# Patient Record
Sex: FEMALE | Race: BLACK OR AFRICAN AMERICAN | NOT HISPANIC OR LATINO | URBAN - METROPOLITAN AREA
[De-identification: names, ages, dates, MRNs, and addresses within clinical notes are randomized per-mention and may not be internally consistent; named-entity substitution may affect disease eponyms.]

---

## 2019-08-23 ENCOUNTER — EMERGENCY (EMERGENCY)
Facility: HOSPITAL | Age: 34
LOS: 1 days | Discharge: HOME | End: 2019-08-23
Admitting: EMERGENCY MEDICINE
Payer: MEDICAID

## 2019-08-23 VITALS
OXYGEN SATURATION: 98 % | RESPIRATION RATE: 18 BRPM | WEIGHT: 145.06 LBS | SYSTOLIC BLOOD PRESSURE: 138 MMHG | DIASTOLIC BLOOD PRESSURE: 76 MMHG | HEART RATE: 73 BPM | TEMPERATURE: 98 F

## 2019-08-23 LAB
ALBUMIN SERPL ELPH-MCNC: 3.6 G/DL — SIGNIFICANT CHANGE UP (ref 3.4–5)
ALP SERPL-CCNC: 53 U/L — SIGNIFICANT CHANGE UP (ref 40–120)
ALT FLD-CCNC: 11 U/L — LOW (ref 12–42)
ANION GAP SERPL CALC-SCNC: 7 MMOL/L — LOW (ref 9–16)
AST SERPL-CCNC: 14 U/L — LOW (ref 15–37)
BILIRUB SERPL-MCNC: 0.3 MG/DL — SIGNIFICANT CHANGE UP (ref 0.2–1.2)
BLD GP AB SCN SERPL QL: NEGATIVE — SIGNIFICANT CHANGE UP
BUN SERPL-MCNC: 11 MG/DL — SIGNIFICANT CHANGE UP (ref 7–23)
CALCIUM SERPL-MCNC: 9.2 MG/DL — SIGNIFICANT CHANGE UP (ref 8.5–10.5)
CHLORIDE SERPL-SCNC: 102 MMOL/L — SIGNIFICANT CHANGE UP (ref 96–108)
CO2 SERPL-SCNC: 27 MMOL/L — SIGNIFICANT CHANGE UP (ref 22–31)
CREAT SERPL-MCNC: 0.54 MG/DL — SIGNIFICANT CHANGE UP (ref 0.5–1.3)
GLUCOSE SERPL-MCNC: 79 MG/DL — SIGNIFICANT CHANGE UP (ref 70–99)
HCG SERPL-ACNC: HIGH MIU/ML
HCT VFR BLD CALC: 38.3 % — SIGNIFICANT CHANGE UP (ref 34.5–45)
HGB BLD-MCNC: 12.7 G/DL — SIGNIFICANT CHANGE UP (ref 11.5–15.5)
LIDOCAIN IGE QN: 84 U/L — SIGNIFICANT CHANGE UP (ref 73–393)
MCHC RBC-ENTMCNC: 30.2 PG — SIGNIFICANT CHANGE UP (ref 27–34)
MCHC RBC-ENTMCNC: 33.2 G/DL — SIGNIFICANT CHANGE UP (ref 32–36)
MCV RBC AUTO: 91.2 FL — SIGNIFICANT CHANGE UP (ref 80–100)
PLATELET # BLD AUTO: 205 K/UL — SIGNIFICANT CHANGE UP (ref 150–400)
POTASSIUM SERPL-MCNC: 4 MMOL/L — SIGNIFICANT CHANGE UP (ref 3.5–5.3)
POTASSIUM SERPL-SCNC: 4 MMOL/L — SIGNIFICANT CHANGE UP (ref 3.5–5.3)
PROT SERPL-MCNC: 7.5 G/DL — SIGNIFICANT CHANGE UP (ref 6.4–8.2)
RBC # BLD: 4.2 M/UL — SIGNIFICANT CHANGE UP (ref 3.8–5.2)
RBC # FLD: 12.7 % — SIGNIFICANT CHANGE UP (ref 10.3–14.5)
RH IG SCN BLD-IMP: POSITIVE — SIGNIFICANT CHANGE UP
RH IG SCN BLD-IMP: POSITIVE — SIGNIFICANT CHANGE UP
SODIUM SERPL-SCNC: 136 MMOL/L — SIGNIFICANT CHANGE UP (ref 132–145)
WBC # BLD: 9.7 K/UL — SIGNIFICANT CHANGE UP (ref 3.8–10.5)
WBC # FLD AUTO: 9.7 K/UL — SIGNIFICANT CHANGE UP (ref 3.8–10.5)

## 2019-08-23 PROCEDURE — 76801 OB US < 14 WKS SINGLE FETUS: CPT | Mod: 26

## 2019-08-23 PROCEDURE — 99284 EMERGENCY DEPT VISIT MOD MDM: CPT

## 2019-08-23 PROCEDURE — 76817 TRANSVAGINAL US OBSTETRIC: CPT | Mod: 26

## 2019-08-23 RX ORDER — SODIUM CHLORIDE 9 MG/ML
3 INJECTION INTRAMUSCULAR; INTRAVENOUS; SUBCUTANEOUS ONCE
Refills: 0 | Status: COMPLETED | OUTPATIENT
Start: 2019-08-23 | End: 2019-08-23

## 2019-08-23 RX ADMIN — SODIUM CHLORIDE 3 MILLILITER(S): 9 INJECTION INTRAMUSCULAR; INTRAVENOUS; SUBCUTANEOUS at 13:16

## 2019-08-23 NOTE — ED ADULT NURSE NOTE - OBJECTIVE STATEMENT
c/o lower abdominal cramping, lmp 07/03/19, when asked if she's pregnant states "Could be" denies additional symptoms. no s/s of distress, awaiting provider eval

## 2019-08-23 NOTE — ED ADULT TRIAGE NOTE - CHIEF COMPLAINT QUOTE
LMP 7/3. States she could be pregnant, feels abdominal cramping with nausea. No bleeding or discharge

## 2019-08-23 NOTE — ED PROVIDER NOTE - NSFOLLOWUPINSTRUCTIONS_ED_ALL_ED_FT
Hydrate well.    Take prenatal vitamins as prescribed.    Follow up OB/GYN.    Return for vaginal bleeding, increased pain, or other concerns.

## 2019-08-23 NOTE — ED PROVIDER NOTE - OBJECTIVE STATEMENT
35 y/o F, A1, LMP 7/3 presents to ED c/o pelvic pressure and cramping with associated nausea x 3-4 days with vomiting x 2 episodes today.  Pt is sexually active and not on using any birth control.  She suspects she may be pregnant.  She states her menstrual cycle is regular.  She denies fevers/chills, chest pain, cough, SOB, diarrhea, dysuria, hematuria.

## 2019-08-23 NOTE — ED PROVIDER NOTE - CLINICAL SUMMARY MEDICAL DECISION MAKING FREE TEXT BOX
35 y/o F presents to ED to pelvic pain and cramping with associated nausea.  Pt well appearing, VSS. NAD, abd soft, non-tender.  Upreg positive. Pt informed.  TVUS 33 y/o F presents to ED to pelvic pain and cramping with associated nausea.  Pt well appearing, VSS. NAD, abd soft, non-tender.  Upreg positive. Pt informed.  TVUS shows IUP of 8 weeks, 0 days,  with subchorionic hemorrhage.  Results discussed.  Pt advised to f/u with OB.  Will Rx prenatal vitamines.  Return precautions advised.

## 2019-08-28 DIAGNOSIS — O21.9 VOMITING OF PREGNANCY, UNSPECIFIED: ICD-10-CM

## 2019-08-28 DIAGNOSIS — Z3A.00 WEEKS OF GESTATION OF PREGNANCY NOT SPECIFIED: ICD-10-CM

## 2019-08-28 DIAGNOSIS — O20.8 OTHER HEMORRHAGE IN EARLY PREGNANCY: ICD-10-CM

## 2019-08-28 DIAGNOSIS — Z87.891 PERSONAL HISTORY OF NICOTINE DEPENDENCE: ICD-10-CM

## 2019-08-28 DIAGNOSIS — R10.9 UNSPECIFIED ABDOMINAL PAIN: ICD-10-CM

## 2022-08-18 NOTE — ED ADULT NURSE NOTE - PAIN: PRESENCE, MLM
Discharge instructions reviewed with patient. Verbalized understanding. A&O x4. Skin p/w/d. Respirations even and unlabored. No acute distress noted at this time. Patient amb with steady gait on discharge.          Jade Gee RN  08/17/22 [id number]
complains of pain/discomfort

## 2022-12-02 NOTE — ED ADULT NURSE NOTE - NSFALLRSKOUTCOME_ED_ALL_ED
Adbry Monitoring Guidelines: There is no laboratory monitoring requirement with Adbry. Initial Lfts (Optional): pending Is Methotrexate Contraindicated?: No Adbry Dosing: 600 mg SC day 0 then 300 mg SC every other week Diagnosis (Required): Atopic Dermatitis/Eczematous Dermatitis Detail Level: Generalized Pregnancy And Lactation Warning Text: It is unknown if this medication will adversely affect pregnancy or breast feeding. Universal Safety Interventions

## 2023-04-12 ENCOUNTER — EMERGENCY (EMERGENCY)
Facility: HOSPITAL | Age: 38
LOS: 1 days | Discharge: HOME | End: 2023-04-12
Attending: EMERGENCY MEDICINE | Admitting: EMERGENCY MEDICINE
Payer: MEDICAID

## 2023-04-12 VITALS
OXYGEN SATURATION: 99 % | HEART RATE: 71 BPM | RESPIRATION RATE: 16 BRPM | TEMPERATURE: 98 F | DIASTOLIC BLOOD PRESSURE: 90 MMHG | SYSTOLIC BLOOD PRESSURE: 142 MMHG

## 2023-04-12 VITALS
HEART RATE: 77 BPM | DIASTOLIC BLOOD PRESSURE: 87 MMHG | OXYGEN SATURATION: 99 % | RESPIRATION RATE: 18 BRPM | WEIGHT: 169.98 LBS | SYSTOLIC BLOOD PRESSURE: 148 MMHG | TEMPERATURE: 98 F | HEIGHT: 64 IN

## 2023-04-12 DIAGNOSIS — Z3A.01 LESS THAN 8 WEEKS GESTATION OF PREGNANCY: ICD-10-CM

## 2023-04-12 DIAGNOSIS — R10.9 UNSPECIFIED ABDOMINAL PAIN: ICD-10-CM

## 2023-04-12 DIAGNOSIS — Z32.01 ENCOUNTER FOR PREGNANCY TEST, RESULT POSITIVE: ICD-10-CM

## 2023-04-12 DIAGNOSIS — O26.891 OTHER SPECIFIED PREGNANCY RELATED CONDITIONS, FIRST TRIMESTER: ICD-10-CM

## 2023-04-12 LAB
ALBUMIN SERPL ELPH-MCNC: 3.9 G/DL — SIGNIFICANT CHANGE UP (ref 3.4–5)
ALP SERPL-CCNC: 70 U/L — SIGNIFICANT CHANGE UP (ref 40–120)
ALT FLD-CCNC: 19 U/L — SIGNIFICANT CHANGE UP (ref 12–42)
ANION GAP SERPL CALC-SCNC: 8 MMOL/L — LOW (ref 9–16)
APPEARANCE UR: CLEAR — SIGNIFICANT CHANGE UP
APTT BLD: 28.7 SEC — SIGNIFICANT CHANGE UP (ref 27.5–35.5)
AST SERPL-CCNC: 19 U/L — SIGNIFICANT CHANGE UP (ref 15–37)
BASOPHILS # BLD AUTO: 0.06 K/UL — SIGNIFICANT CHANGE UP (ref 0–0.2)
BASOPHILS NFR BLD AUTO: 0.5 % — SIGNIFICANT CHANGE UP (ref 0–2)
BILIRUB SERPL-MCNC: 0.3 MG/DL — SIGNIFICANT CHANGE UP (ref 0.2–1.2)
BILIRUB UR-MCNC: NEGATIVE — SIGNIFICANT CHANGE UP
BUN SERPL-MCNC: 9 MG/DL — SIGNIFICANT CHANGE UP (ref 7–23)
CALCIUM SERPL-MCNC: 9 MG/DL — SIGNIFICANT CHANGE UP (ref 8.5–10.5)
CHLORIDE SERPL-SCNC: 104 MMOL/L — SIGNIFICANT CHANGE UP (ref 96–108)
CO2 SERPL-SCNC: 26 MMOL/L — SIGNIFICANT CHANGE UP (ref 22–31)
COLOR SPEC: YELLOW — SIGNIFICANT CHANGE UP
CREAT SERPL-MCNC: 0.79 MG/DL — SIGNIFICANT CHANGE UP (ref 0.5–1.3)
DIFF PNL FLD: NEGATIVE — SIGNIFICANT CHANGE UP
EGFR: 100 ML/MIN/1.73M2 — SIGNIFICANT CHANGE UP
EOSINOPHIL # BLD AUTO: 0.14 K/UL — SIGNIFICANT CHANGE UP (ref 0–0.5)
EOSINOPHIL NFR BLD AUTO: 1.3 % — SIGNIFICANT CHANGE UP (ref 0–6)
GLUCOSE SERPL-MCNC: 95 MG/DL — SIGNIFICANT CHANGE UP (ref 70–99)
GLUCOSE UR QL: NEGATIVE — SIGNIFICANT CHANGE UP
HCG SERPL-ACNC: 605 MIU/ML — HIGH
HCG UR QL: POSITIVE — SIGNIFICANT CHANGE UP
HCT VFR BLD CALC: 36.5 % — SIGNIFICANT CHANGE UP (ref 34.5–45)
HGB BLD-MCNC: 12.1 G/DL — SIGNIFICANT CHANGE UP (ref 11.5–15.5)
IMM GRANULOCYTES NFR BLD AUTO: 0.3 % — SIGNIFICANT CHANGE UP (ref 0–0.9)
INR BLD: 1.03 — SIGNIFICANT CHANGE UP (ref 0.88–1.16)
KETONES UR-MCNC: NEGATIVE — SIGNIFICANT CHANGE UP
LEUKOCYTE ESTERASE UR-ACNC: NEGATIVE — SIGNIFICANT CHANGE UP
LIDOCAIN IGE QN: 80 U/L — SIGNIFICANT CHANGE UP (ref 73–393)
LYMPHOCYTES # BLD AUTO: 4.52 K/UL — HIGH (ref 1–3.3)
LYMPHOCYTES # BLD AUTO: 41.3 % — SIGNIFICANT CHANGE UP (ref 13–44)
MCHC RBC-ENTMCNC: 30.9 PG — SIGNIFICANT CHANGE UP (ref 27–34)
MCHC RBC-ENTMCNC: 33.2 GM/DL — SIGNIFICANT CHANGE UP (ref 32–36)
MCV RBC AUTO: 93.4 FL — SIGNIFICANT CHANGE UP (ref 80–100)
MONOCYTES # BLD AUTO: 0.66 K/UL — SIGNIFICANT CHANGE UP (ref 0–0.9)
MONOCYTES NFR BLD AUTO: 6 % — SIGNIFICANT CHANGE UP (ref 2–14)
NEUTROPHILS # BLD AUTO: 5.54 K/UL — SIGNIFICANT CHANGE UP (ref 1.8–7.4)
NEUTROPHILS NFR BLD AUTO: 50.6 % — SIGNIFICANT CHANGE UP (ref 43–77)
NITRITE UR-MCNC: NEGATIVE — SIGNIFICANT CHANGE UP
NRBC # BLD: 0 /100 WBCS — SIGNIFICANT CHANGE UP (ref 0–0)
PH UR: 7 — SIGNIFICANT CHANGE UP (ref 5–8)
PLATELET # BLD AUTO: 221 K/UL — SIGNIFICANT CHANGE UP (ref 150–400)
POTASSIUM SERPL-MCNC: 3.8 MMOL/L — SIGNIFICANT CHANGE UP (ref 3.5–5.3)
POTASSIUM SERPL-SCNC: 3.8 MMOL/L — SIGNIFICANT CHANGE UP (ref 3.5–5.3)
PROT SERPL-MCNC: 7.4 G/DL — SIGNIFICANT CHANGE UP (ref 6.4–8.2)
PROT UR-MCNC: NEGATIVE MG/DL — SIGNIFICANT CHANGE UP
PROTHROM AB SERPL-ACNC: 12.1 SEC — SIGNIFICANT CHANGE UP (ref 10.5–13.4)
RBC # BLD: 3.91 M/UL — SIGNIFICANT CHANGE UP (ref 3.8–5.2)
RBC # FLD: 13.2 % — SIGNIFICANT CHANGE UP (ref 10.3–14.5)
SODIUM SERPL-SCNC: 138 MMOL/L — SIGNIFICANT CHANGE UP (ref 132–145)
SP GR SPEC: 1.02 — SIGNIFICANT CHANGE UP (ref 1–1.03)
UROBILINOGEN FLD QL: 1 E.U./DL — SIGNIFICANT CHANGE UP
WBC # BLD: 10.95 K/UL — HIGH (ref 3.8–10.5)
WBC # FLD AUTO: 10.95 K/UL — HIGH (ref 3.8–10.5)

## 2023-04-12 PROCEDURE — 76830 TRANSVAGINAL US NON-OB: CPT | Mod: 26

## 2023-04-12 PROCEDURE — 99284 EMERGENCY DEPT VISIT MOD MDM: CPT

## 2023-04-12 PROCEDURE — 76856 US EXAM PELVIC COMPLETE: CPT | Mod: 26

## 2023-04-12 RX ORDER — ACETAMINOPHEN 500 MG
650 TABLET ORAL ONCE
Refills: 0 | Status: COMPLETED | OUTPATIENT
Start: 2023-04-12 | End: 2023-04-12

## 2023-04-12 RX ADMIN — Medication 650 MILLIGRAM(S): at 17:56

## 2023-04-12 NOTE — ED PROVIDER NOTE - NSFOLLOWUPINSTRUCTIONS_ED_ALL_ED_FT
RETURN IN 48 HRS FOR REPEAT HCG LEVEL AND US.    YOU WERE FOUND TO BE PREGNANT TODAY BUT THE US FINDINGS ARE EQUIVOCAL FOR VIABILITY OR LOCATION OF YOUR PREGNANCY. IT IS IMPORTANT TO TREND YOUR HORMONE LEVELS AND CORRELATE WITH ULTRASOUND UNTIL DEFINITIVE PREGNANCY CAN BE IDENTIFIED.    IF YOU HAVE PELVIC PAIN OR VAGINAL BLEEDING, OR FEEL FAINT, RETURN TO ER SOONER FOR RE-EVALUATION.

## 2023-04-12 NOTE — ED PROVIDER NOTE - PATIENT PORTAL LINK FT
You can access the FollowMyHealth Patient Portal offered by [facility] by registering at the following website: [email or web address]. By joining [employer]’s FollowMyHealth portal, you will also be able to view your health information using other applications (apps) compatible with our system.

## 2023-04-12 NOTE — ED ADULT NURSE NOTE - OBJECTIVE STATEMENT
c/o abdominal pain/cramping for several days. denies nausea, vomiting, diarrhea. reports LMP march. reports consistent cramping as though she is having her period.

## 2023-04-12 NOTE — ED PROVIDER NOTE - PROGRESS NOTE DETAILS
had a detailed conversation w pt about +HCG and equivocal US since only has a sac at this time, no fetal pole or yolk sac. explained possibilities of pregnancy of unknown location, early pregnancy vs ectopic. Clinically pt is well appearing and is pain free with tylenol. encouraged to return to er in 2 days for repeat hcg and us and to return sooner if any concerns.

## 2023-04-12 NOTE — ED ADULT TRIAGE NOTE - CHIEF COMPLAINT QUOTE
c/o pain and heaviness in lower abdomen/ suprapubic , states she felt this same way when having a miscarriage.  lmp beginning of march. intermittent contraction like pain and heaviness. + urinary frequency.

## 2023-04-12 NOTE — ED PROVIDER NOTE - OBJECTIVE STATEMENT
38 y/o Female (1 miscarriage, 1 prior ) with no PMHx presenting with abdominal pain today. Patient states that her LNMP was during the 2nd week of March. Patient states that shes been having abdominal pain and pressure without vaginal bleeding. Patient states that she doesn't know her blood type. Denies past abd surgeries. Denies fever/chills.

## 2023-04-17 ENCOUNTER — EMERGENCY (EMERGENCY)
Facility: HOSPITAL | Age: 38
LOS: 1 days | Discharge: HOME | End: 2023-04-17
Attending: EMERGENCY MEDICINE | Admitting: EMERGENCY MEDICINE
Payer: MEDICAID

## 2023-04-17 VITALS
DIASTOLIC BLOOD PRESSURE: 76 MMHG | TEMPERATURE: 98 F | HEART RATE: 72 BPM | SYSTOLIC BLOOD PRESSURE: 117 MMHG | OXYGEN SATURATION: 100 % | RESPIRATION RATE: 18 BRPM

## 2023-04-17 VITALS
RESPIRATION RATE: 18 BRPM | HEART RATE: 84 BPM | SYSTOLIC BLOOD PRESSURE: 137 MMHG | DIASTOLIC BLOOD PRESSURE: 82 MMHG | OXYGEN SATURATION: 100 % | WEIGHT: 179.9 LBS | HEIGHT: 64 IN | TEMPERATURE: 98 F

## 2023-04-17 DIAGNOSIS — O20.0 THREATENED ABORTION: ICD-10-CM

## 2023-04-17 DIAGNOSIS — Z3A.01 LESS THAN 8 WEEKS GESTATION OF PREGNANCY: ICD-10-CM

## 2023-04-17 DIAGNOSIS — O26.891 OTHER SPECIFIED PREGNANCY RELATED CONDITIONS, FIRST TRIMESTER: ICD-10-CM

## 2023-04-17 LAB
APPEARANCE UR: CLEAR — SIGNIFICANT CHANGE UP
BACTERIA # UR AUTO: PRESENT /HPF
BILIRUB UR-MCNC: NEGATIVE — SIGNIFICANT CHANGE UP
COLOR SPEC: YELLOW — SIGNIFICANT CHANGE UP
COMMENT - URINE: SIGNIFICANT CHANGE UP
DIFF PNL FLD: NEGATIVE — SIGNIFICANT CHANGE UP
EPI CELLS # UR: ABNORMAL /HPF (ref 0–5)
GLUCOSE UR QL: NEGATIVE — SIGNIFICANT CHANGE UP
HCG SERPL-ACNC: 2615 MIU/ML — HIGH
KETONES UR-MCNC: NEGATIVE — SIGNIFICANT CHANGE UP
LEUKOCYTE ESTERASE UR-ACNC: ABNORMAL
NITRITE UR-MCNC: NEGATIVE — SIGNIFICANT CHANGE UP
PH UR: 6 — SIGNIFICANT CHANGE UP (ref 5–8)
PROT UR-MCNC: NEGATIVE MG/DL — SIGNIFICANT CHANGE UP
RBC CASTS # UR COMP ASSIST: < 5 /HPF — SIGNIFICANT CHANGE UP
SP GR SPEC: >=1.03 — SIGNIFICANT CHANGE UP (ref 1–1.03)
UROBILINOGEN FLD QL: 0.2 E.U./DL — SIGNIFICANT CHANGE UP
WBC UR QL: ABNORMAL /HPF

## 2023-04-17 PROCEDURE — 76830 TRANSVAGINAL US NON-OB: CPT | Mod: 26

## 2023-04-17 PROCEDURE — 99284 EMERGENCY DEPT VISIT MOD MDM: CPT

## 2023-04-17 RX ORDER — NITROFURANTOIN MACROCRYSTAL 50 MG
100 CAPSULE ORAL ONCE
Refills: 0 | Status: COMPLETED | OUTPATIENT
Start: 2023-04-17 | End: 2023-04-17

## 2023-04-17 RX ORDER — NITROFURANTOIN MACROCRYSTAL 50 MG
1 CAPSULE ORAL
Qty: 20 | Refills: 0
Start: 2023-04-17 | End: 2023-04-26

## 2023-04-17 RX ORDER — ACETAMINOPHEN 500 MG
650 TABLET ORAL ONCE
Refills: 0 | Status: COMPLETED | OUTPATIENT
Start: 2023-04-17 | End: 2023-04-17

## 2023-04-17 RX ORDER — NITROFURANTOIN MACROCRYSTAL 50 MG
100 CAPSULE ORAL
Refills: 0 | Status: DISCONTINUED | OUTPATIENT
Start: 2023-04-17 | End: 2023-04-17

## 2023-04-17 RX ADMIN — Medication 100 MILLIGRAM(S): at 20:08

## 2023-04-17 RX ADMIN — Medication 650 MILLIGRAM(S): at 17:40

## 2023-04-17 NOTE — ED PROVIDER NOTE - CLINICAL SUMMARY MEDICAL DECISION MAKING FREE TEXT BOX
38 y/o Female  (1 miscarriage, 1 prior ), otherwise healthy, presents for a beta hcg check. Pt was seen at [facility] ED 5 days ago with pelvic cramping and was found to be pregnant with quant beta in 600s and equivocal u/s. Pt was told to return in 2 days for beta check was unable to return until today.  States that her LMP was during the 2nd week of March. No vaginal bleeding. Denies past abd surgeries. Denies fever/chills. Still with mild pelvic cramping. 38 y/o Female  (1 miscarriage, 1 prior ), otherwise healthy, presents for a beta hcg check. Pt was seen at [facility] ED 5 days ago with pelvic cramping and was found to be pregnant with quant beta in 600s and equivocal u/s. Pt was told to return in 2 days for beta check was unable to return until today.  States that her LMP was during the 2nd week of March. No vaginal bleeding. Denies past abd surgeries. Denies fever/chills. Still with mild pelvic cramping.    [id number] to [id number] 38 y/o Female  (1 miscarriage, 1 prior ), otherwise healthy, presents for a beta hcg check. Pt was seen at [facility] ED 5 days ago with pelvic cramping and was found to be pregnant with quant beta in 600s and pelvic u/s which showed "Findings compatible with pregnancy of unknown location. There is a small cystic structure within the endometrium with an average diameter of 0.2 cm corresponding to a gestational age of 4 weeks 5 days. This may represent an early intrauterine gestation versus a pseudogestational sac. Serial beta-hCG and short interval surveillance   obstetrical ultrasound is suggested for more complete evaluation."   Pt was told to return in 2 days for beta check but was unable to return until today.  States that her LMP was during the 2nd week of March. No vaginal bleeding. Denies unusual vaginal discharge, urinary sxs or flank pain. Denies past abd surgeries. Denies fever/chills. Still with mild pelvic cramping.    ED course: VS noted and WNL. Labs noted and quant beta hcg is 2615 (up from 605 five days ago). Quant beta is appropriately rising. Repeat u/s done and shows IUG (was a limited study sec to tech issues). UA with possible infection. Given that pt is pregnant she was treated for UTI with Macrobid. Rx given. Findings discussed with pt. Non-toxic appearing and stable for discharge. To follow up outpatient. Strict return precautions given. 38 y/o Female  (1 miscarriage, 1 prior ), otherwise healthy, presents for a beta hcg check. Pt was seen at [facility] ED 5 days ago with pelvic cramping and was found to be pregnant with quant beta in 600s and pelvic u/s which showed "Findings compatible with pregnancy of unknown location. There is a small cystic structure within the endometrium with an average diameter of 0.2 cm corresponding to a gestational age of 4 weeks 5 days. This may represent an early intrauterine gestation versus a pseudogestational sac. Serial beta-hCG and short interval surveillance   obstetrical ultrasound is suggested for more complete evaluation."  Pt was told to return in 2 days for beta check, but was unable to return until today. States that her LMP was during the 2nd week of March. No vaginal bleeding. Denies unusual vaginal discharge, urinary sxs or flank pain. Denies past abd surgeries. Denies fever/chills. Still with mild pelvic cramping.    ED course: VS noted and WNL. Given Tylenol. Labs noted and quant beta hcg is 2615 (up from 605 five days ago). Quant beta is appropriately rising. Repeat u/s done and shows IUG (was a limited study sec to tech issues). UA with possible infection. Given that pt is pregnant she was treated for UTI with Macrobid. Rx given. Findings discussed with pt. Non-toxic appearing and stable for discharge. To follow up outpatient with Obgyn. Strict return precautions given.

## 2023-04-17 NOTE — ED PROVIDER NOTE - PATIENT PORTAL LINK FT
You can access the FollowMyHealth Patient Portal offered by [facility] by registering at the following website: [email or web address]. By joining [employer]’s FollowMyHealth portal, you will also be able to view your health information using other applications (apps) compatible with our system.

## 2023-04-17 NOTE — ED ADULT NURSE NOTE - ISOLATION TYPE:
----- Message from Tamia Jones sent at 4/13/2017  2:58 PM CDT -----  Contact: Wife,Amarilis  Patient need a refill on pain patches please send to [facility], any question please call back at [phone]      [facility] [address] [facility]  [address]  [address]  [place] [address]  Phone: [phone] Fax: [phone]             None

## 2023-04-17 NOTE — ED PROVIDER NOTE - NSFOLLOWUPINSTRUCTIONS_ED_ALL_ED_FT
Please follow up with a Gynecologist in 3-5 days. Return to the ER if you develop any concerning symptoms.     Threatened Miscarriage  A threatened miscarriage is when a woman bleeds in her vagina during the first 20 weeks of pregnancy but the pregnancy has not ended. The doctor will do tests to make sure you are still pregnant. This condition does not mean your pregnancy will end, but it does increase the risk that it will end (miscarriage).    What are the causes?  Normally, the cause of this condition is not known.    What increases the risk?  These things may make a pregnant woman more likely to lose a pregnancy:    Certain health problems    Conditions that affect hormones, such as thyroid disease or polycystic ovary syndrome.  Diabetes.  Disorders that cause the body's disease-fighting system to attack itself by mistake.  Infections.  Bleeding problems.  Being very overweight.  Lifestyle factors    Using products that have tobacco or nicotine in them.  Being around tobacco smoke.  Having a lot of caffeine.  Using drugs.  Problems with reproductive organs or parts    Having a cervix that opens and thins before you are ready to give birth. The cervix is the lowest part of the womb.  Having Asherman syndrome, which leads to:  Scars in the womb.  The womb being an abnormal shape.  Growths (fibroids) in the womb.  Problems in the body that are present at birth.  Infection of the cervix or womb.  Personal or health history    Injury.  Having lost an unborn baby before.  Being younger than age 18 or older than age 35.  Being around a harmful substance, such as radiation.  Having lead or other heavy metals in:  Things you eat or drink.  The air around you.  Using certain medicines.  What are the signs or symptoms?  Bleeding from the vagina. You may also have cramps or pain.  Mild pain or cramps in your belly.  How is this diagnosed?    The doctor will do tests, such as an ultrasound to make sure you are still pregnant.  How is this treated?  There are no treatments that prevent loss of pregnancy. But you need to do the right things to take care of yourself at home.    Follow these instructions at home:  Get a lot of rest.  Do not have sex or douche if there is bleeding in the vagina.  Do not put things such as tampons in the vagina if it is bleeding.  Do not smoke or use drugs.  Do not drink alcohol.  Avoid caffeine.  Keep all follow-up visits while you are pregnant.  Contact a doctor if:  You are pregnant and you have one of these:  Light bleeding coming from your vagina.  Spots of blood coming from your vagina.  You have belly pain or cramping.  You have a fever.  Get help right away if:  Blood soaks through 2 large pads an hour for more than 2 hours.  Clots of blood come from your vagina.  Tissue comes out of your vagina.  Fluid leaks or gushes from your vagina.  You have very bad pain in your low back.  You have very bad cramps in your belly.  You have a fever, chills, and very bad belly pain.  Summary  A threatened miscarriage is when a woman bleeds in her vagina during the first 20 weeks of pregnancy but the pregnancy has not ended.  Normally, the cause of this condition is not known.  Symptoms include bleeding in the vagina or mild pain or cramps in your belly.  There are no treatments that prevent loss of pregnancy.  Keep all follow-up visits while you are pregnant.  This information is not intended to replace advice given to you by your health care provider. Make sure you discuss any questions you have with your health care provider.    Urinary Tract Infection    A urinary tract infection (UTI) is an infection of any part of the urinary tract, which includes the kidneys, ureters, bladder, and urethra. Risk factors include ignoring your need to urinate, wiping back to front if female, being an uncircumcised male, and having diabetes or a weak immune system. Symptoms include frequent urination, pain or burning with urination, foul smelling urine, cloudy urine, pain in the lower abdomen, blood in the urine, and fever. If you were prescribed an antibiotic medicine, take it as told by your health care provider. Do not stop taking the antibiotic even if you start to feel better.    SEEK IMMEDIATE MEDICAL CARE IF YOU HAVE ANY OF THE FOLLOWING SYMPTOMS: severe back or abdominal pain, fever, inability to keep fluids or medicine down, dizziness/lightheadedness, or a change in mental status.

## 2023-04-17 NOTE — ED PROVIDER NOTE - OBJECTIVE STATEMENT
36 y/o Female  (1 miscarriage, 1 prior ), otherwise healthy, presents for a beta hcg check. Pt was seen at [facility] ED 5 days ago with pelvic cramping and was found to be pregnant with quant beta in 600s and equivocal u/s. Pt was told to return in 2 days for beta check was unable to return until today.  States that her LMP was during the 2nd week of March. No vaginal bleeding. Denies past abd surgeries. Denies fever/chills. Still with mild pelvic cramping. 38 y/o Female  (1 miscarriage, 1 prior ), otherwise healthy, presents for a beta hcg check. Pt was seen at [facility] ED 5 days ago with pelvic cramping and was found to be pregnant with quant beta in 600s and equivocal u/s. Pt was told to return in 2 days for beta check was unable to return until today.  States that her LMP was during the 2nd week of March. No vaginal bleeding. Denies past abd surgeries. Denies fever/chills. Still with mild pelvic cramping.  Findings compatible with pregnancy of unknown location.  There is a small cystic structure within the endometrium with an average   diameter of 0.2 cm corresponding to a gestational age of 4 weeks 5 days.   This may represent an early intrauterine gestation versus a   pseudogestational sac. Serial beta-hCG and short interval surveillance   obstetrical ultrasound is suggested for more complete evaluation. 38 y/o Female  (1 miscarriage, 1 prior ), otherwise healthy, presents for a beta hcg check. Pt was seen at [facility] ED 5 days ago with pelvic cramping and was found to be pregnant with quant beta in 600s and pelvic u/s which showed "Findings compatible with pregnancy of unknown location. There is a small cystic structure within the endometrium with an average diameter of 0.2 cm corresponding to a gestational age of 4 weeks 5 days. This may represent an early intrauterine gestation versus a pseudogestational sac. Serial beta-hCG and short interval surveillance   obstetrical ultrasound is suggested for more complete evaluation."   Pt was told to return in 2 days for beta check but was unable to return until today.  States that her LMP was during the 2nd week of March. No vaginal bleeding. Denies unusual vaginal discharge, urinary sxs or flank pain. Denies past abd surgeries. Denies fever/chills. Still with mild pelvic cramping. 38 y/o Female  (1 miscarriage, 1 prior ), otherwise healthy, presents for a beta hcg check. Pt was seen at [facility] ED 5 days ago with pelvic cramping and was found to be pregnant with quant beta in 600s and pelvic u/s which showed "Findings compatible with pregnancy of unknown location. There is a small cystic structure within the endometrium with an average diameter of 0.2 cm corresponding to a gestational age of 4 weeks 5 days. This may represent an early intrauterine gestation versus a pseudogestational sac. Serial beta-hCG and short interval surveillance   obstetrical ultrasound is suggested for more complete evaluation."  Pt was told to return in 2 days for beta check but was unable to return until today.  States that her LMP was during the 2nd week of March. No vaginal bleeding. Denies unusual vaginal discharge, urinary sxs or flank pain. Denies past abd surgeries. Denies fever/chills. Still with mild pelvic cramping.

## 2023-05-02 ENCOUNTER — EMERGENCY (EMERGENCY)
Facility: HOSPITAL | Age: 38
LOS: 1 days | Discharge: HOME | End: 2023-05-02
Admitting: EMERGENCY MEDICINE
Payer: MEDICAID

## 2023-05-02 VITALS
OXYGEN SATURATION: 98 % | HEART RATE: 80 BPM | TEMPERATURE: 98 F | WEIGHT: 179.02 LBS | HEIGHT: 64 IN | DIASTOLIC BLOOD PRESSURE: 80 MMHG | RESPIRATION RATE: 16 BRPM | SYSTOLIC BLOOD PRESSURE: 128 MMHG

## 2023-05-02 VITALS
SYSTOLIC BLOOD PRESSURE: 132 MMHG | TEMPERATURE: 98 F | OXYGEN SATURATION: 97 % | RESPIRATION RATE: 16 BRPM | HEART RATE: 61 BPM | DIASTOLIC BLOOD PRESSURE: 85 MMHG

## 2023-05-02 LAB
APPEARANCE UR: CLEAR — SIGNIFICANT CHANGE UP
BACTERIA # UR AUTO: PRESENT /HPF
BILIRUB UR-MCNC: NEGATIVE — SIGNIFICANT CHANGE UP
COLOR SPEC: YELLOW — SIGNIFICANT CHANGE UP
COMMENT - URINE: SIGNIFICANT CHANGE UP
DIFF PNL FLD: NEGATIVE — SIGNIFICANT CHANGE UP
EPI CELLS # UR: ABNORMAL /HPF (ref 0–5)
GLUCOSE UR QL: NEGATIVE — SIGNIFICANT CHANGE UP
HCG SERPL-ACNC: 5383 MIU/ML — HIGH
KETONES UR-MCNC: NEGATIVE — SIGNIFICANT CHANGE UP
LEUKOCYTE ESTERASE UR-ACNC: ABNORMAL
NITRITE UR-MCNC: NEGATIVE — SIGNIFICANT CHANGE UP
PH UR: 6.5 — SIGNIFICANT CHANGE UP (ref 5–8)
PROT UR-MCNC: NEGATIVE MG/DL — SIGNIFICANT CHANGE UP
RBC CASTS # UR COMP ASSIST: < 5 /HPF — SIGNIFICANT CHANGE UP
SP GR SPEC: 1.01 — SIGNIFICANT CHANGE UP (ref 1–1.03)
UROBILINOGEN FLD QL: 0.2 E.U./DL — SIGNIFICANT CHANGE UP
WBC UR QL: ABNORMAL /HPF

## 2023-05-02 PROCEDURE — 76817 TRANSVAGINAL US OBSTETRIC: CPT | Mod: 26

## 2023-05-02 PROCEDURE — 99284 EMERGENCY DEPT VISIT MOD MDM: CPT

## 2023-05-02 PROCEDURE — 76801 OB US < 14 WKS SINGLE FETUS: CPT | Mod: 26

## 2023-05-02 NOTE — ED PROVIDER NOTE - PHYSICAL EXAMINATION
CONSTITUTIONAL   General appearance: looks well, no acute distress, alert, interactive, pleasant, answers questions appropriately    EYES   RIGHT eye: Normal Conjunctiva and Lid   LEFT eye: Normal Conjunctiva and Lid   Sclerae: NO subconjunctival hemorrhage, No scleral icterus   PUPILS: PERRL   EOM: EOMI     EAR / NOSE / THROAT   Moist mucous membranes      NECK   Supple, no meningismus, trachea midline, no masses, full ROM     CARDIOVASCULAR   Heart Auscultation: RRR, Normal S1, S2 heart sounds, No murmurs, rubs or gallops   Distal pulses palpable     LUNGS   Auscultation: breath sounds normal, good air movement, NO wheezing, NO rales or crackles, NO rhonchi or coarse BS   Respiratory effort: No respiratory distress, normal respiration effort, speaking in Full Sentences     ABDOMEN   Soft, suprapubic ttp, NO mass or distension, NO guarding or rebound, normal bowel sounds, negative Vega's sign, NO tenderness RLQ, NO organomegaly, No flank tenderness, NO pulsatile mass     LYMPHATIC   NO Anterior cervical adenopathy, NO Posterior cervical adenopathy     SKIN   Normal color, NO rash, NO erythema, NO bruising, NO skin lesion     PSYCHIATRIC   Judgement and insight intact, normal mood and affect, patient at baseline MS     NEUROLOGIC    Alert and oriented x 3, Speech normal, No focal deficits, normal motor and sensory, MAEx4

## 2023-05-02 NOTE — ED PROVIDER NOTE - NS ED ROS FT
REVIEW OF SYSTEMS:    CONSTITUTIONAL: no fevers, chills, recent weight loss, night sweats   HEENT: no eye pain, nasal congestion, rhinorrhea, sore throat   CV: no chest pain, palpitations   PULM: no coughing, SOA, wheezes, pleuritic pain, hemoptysis    GI: no n/v/d, constipation, hematemesis, bloody stools, dark/tarry stools   : no flank pain, dysuria, hematuria    MS: no extremity pain, neck pain, back pain   SKIN: no rash   NEURO: no headache, photophobia, phonophobia, vision changes, focal weakness, numbness/tingling, syncope   PSYCH: no SI/HI

## 2023-05-02 NOTE — ED PROVIDER NOTE - OBJECTIVE STATEMENT
38 yo F patient who is approx 8 weeks pregnant, , here with pelvic pain.     Was seen at [facility] few weeks ago, found to be pregnant with quant in  quant beta in 600s and pelvic u/s which showed "Findings compatible with pregnancy of unknown location. There is a small cystic structure within the endometrium with an average diameter of 0.2 cm corresponding to a gestational age of 4 weeks 5 days. This may represent an early intrauterine gestation versus a pseudogestational sac. Serial beta-hCG and short interval surveillance. "      Pt does have an established OBGYN now but has not been able to see them since last ER visit. Has been having intermittent lower abd cramping pain and is concerned for the pregnancy. Denies any dc, back pain, n/v/d, bleeding or any other sx.

## 2023-05-02 NOTE — ED PROVIDER NOTE - PATIENT PORTAL LINK FT
You can access the FollowMyHealth Patient Portal offered by [facility] by registering at the following website: [email or web address]. By joining [employer]’s FollowMyHealth portal, you will also be able to view your health information using other applications (apps) compatible with our system.

## 2023-05-02 NOTE — ED PROVIDER NOTE - NSFOLLOWUPINSTRUCTIONS_ED_ALL_ED_FT
Abdominal Pain in Pregnancy    AMBULATORY CARE:    Abdominal pain during pregnancy is common. Some of the causes include heartburn, constipation, gas, false labor, and round ligament pain. Round ligament pain is caused by stretching of the ligaments that support your uterus. Abdominal pain may be caused by a health problem, such as a stomach virus or appendicitis (inflammation of the appendix). The pain may also be caused by a problem with your pregnancy, such as a threatened miscarriage or  labor.    Call your local emergency number (911 in the ) if:    You have a fast heartbeat.    You have shortness of breath.    You feel lightheaded or faint.  Seek care immediately if:    You have sudden, severe pain or cramps that are so bad that you cannot walk or talk.    You have vaginal bleeding or discharge.    You have nausea, vomiting, fever, and severe pain on your right side.  Call your obstetrician if:    You have light vaginal bleeding or spotting.    You continue to have abdominal pain that cannot be relieved.    You have a fever.    You have questions or concerns about your condition or care.  Treatment will depend on the cause of your pain. Ask your healthcare provider before you take any medicine during pregnancy, including over-the-counter pain medicines. Acetaminophen may be recommended. Ask how much to take and how often to take it. Follow directions. Acetaminophen can cause liver damage if not taken correctly. Your provider will tell you how much is safe to take each day during pregnancy. Too much medicine can be harmful to your baby. Read the labels of all other medicines you are using to see if they also contain acetaminophen, or ask your doctor or pharmacist.    Self-care:    Rest as needed. Rest may help to relieve pain. Your healthcare provider may recommend that you rest on your side instead of on your back. He or she may tell you to lie on your left side, if possible. Place a pillow under your abdomen. Keep another pillow between your knees. Ask your provider about other ways to relieve this pain, such as a supportive belt or pregnancy exercises.    Do not lie flat in bed or bend over if you have heartburn. Ask your obstetrician if you should make any changes to the foods you eat. Ask if you can take any medicines for heartburn.  Prevent GERD      Move slowly. Avoid quick changes in position or movements that cause pain.    Exercise as directed. Gentle exercise can keep the ligaments loose and strengthen core (abdominal) muscles. An example is swimming, or a yoga program designed for pregnancy. Ask your healthcare provider which exercises are safe for you and how often to exercise. For most healthy women, a good goal is to try to get at least 30 minutes of exercise every day. If activity causes pain, try not to walk too long or too far at one time. Break your exercise up into short amounts.  Pregnancy Yoga      Apply a warm compress to the area. Warmth can relieve pain and muscle spasms. Ask your healthcare provider if you can take a warm bath or use a heating pad. Keep all heat settings low. High heat can be dangerous for your baby. Do not sit in a hot tub or use hot water in your bath. You may also be able to massage the area gently while you are applying heat. Massage can help relieve pain.    Eat more fiber and drink more liquids to relieve constipation. Fiber is found in fruits, vegetables, and whole-grain foods, such as whole-wheat bread and cereals. Ask how much liquid to drink each day and which liquids are best for you.    Follow up with your obstetrician within 3 days or as directed: Write down your questions so you remember to ask them during your visits.

## 2023-05-02 NOTE — ED ADULT NURSE NOTE - NS ED NOTE  TALK SOMEONE YN
No Additional Notes: Pt is currently using tretinoin 0.025% with minor improvement.she mentions her acne has been worse since last visit. Mom stated she seen her pcp the day previous from today’s visit and her pcp prescribed MINOCYCLINE and she has yet to take it. Render Risk Assessment In Note?: no Detail Level: Detailed

## 2023-05-02 NOTE — ED PROVIDER NOTE - CLINICAL SUMMARY MEDICAL DECISION MAKING FREE TEXT BOX
Patient here with pelvic pain, 2/2 to pregnancy    Patient is NAD, well appearing, vitals signs stable.      I personally performed 3 separate reevaluation's during the patient's ED visit.  The patient did improved and remained stable throughout the ED visit after ED interventions.       ED findings was discussed in depth and there was understanding of the results.      Reviewed ED findings, US with IUP.    Plan for close f/u with obgyn    Discussed signs and symptoms to immediately return to the ER. Discussed plan, home care instructions, and follow up. Patient feels comfortable with discharge at this time, understands when to return and follow up, agrees with plan of care as discussed, stable for discharge.

## 2023-05-04 DIAGNOSIS — O26.891 OTHER SPECIFIED PREGNANCY RELATED CONDITIONS, FIRST TRIMESTER: ICD-10-CM

## 2023-05-04 DIAGNOSIS — R10.2 PELVIC AND PERINEAL PAIN: ICD-10-CM

## 2023-05-04 DIAGNOSIS — Z3A.08 8 WEEKS GESTATION OF PREGNANCY: ICD-10-CM

## 2024-05-07 ENCOUNTER — EMERGENCY (EMERGENCY)
Facility: HOSPITAL | Age: 39
LOS: 1 days | Discharge: HOME | End: 2024-05-07
Attending: EMERGENCY MEDICINE | Admitting: EMERGENCY MEDICINE
Payer: MEDICAID

## 2024-05-07 VITALS
WEIGHT: 188.05 LBS | DIASTOLIC BLOOD PRESSURE: 84 MMHG | HEIGHT: 62 IN | OXYGEN SATURATION: 98 % | TEMPERATURE: 98 F | HEART RATE: 76 BPM | RESPIRATION RATE: 16 BRPM | SYSTOLIC BLOOD PRESSURE: 126 MMHG

## 2024-05-07 VITALS
RESPIRATION RATE: 16 BRPM | DIASTOLIC BLOOD PRESSURE: 91 MMHG | SYSTOLIC BLOOD PRESSURE: 148 MMHG | TEMPERATURE: 98 F | OXYGEN SATURATION: 99 % | HEART RATE: 67 BPM

## 2024-05-07 DIAGNOSIS — R07.89 OTHER CHEST PAIN: ICD-10-CM

## 2024-05-07 DIAGNOSIS — R79.1 ABNORMAL COAGULATION PROFILE: ICD-10-CM

## 2024-05-07 LAB
ALBUMIN SERPL ELPH-MCNC: 3.7 G/DL — SIGNIFICANT CHANGE UP (ref 3.4–5)
ALP SERPL-CCNC: 73 U/L — SIGNIFICANT CHANGE UP (ref 40–120)
ALT FLD-CCNC: 20 U/L — SIGNIFICANT CHANGE UP (ref 12–42)
ANION GAP SERPL CALC-SCNC: 8 MMOL/L — LOW (ref 9–16)
AST SERPL-CCNC: 20 U/L — SIGNIFICANT CHANGE UP (ref 15–37)
BASOPHILS # BLD AUTO: 0 K/UL — SIGNIFICANT CHANGE UP (ref 0–0.2)
BASOPHILS NFR BLD AUTO: 0 % — SIGNIFICANT CHANGE UP (ref 0–2)
BILIRUB SERPL-MCNC: 0.3 MG/DL — SIGNIFICANT CHANGE UP (ref 0.2–1.2)
BUN SERPL-MCNC: 9 MG/DL — SIGNIFICANT CHANGE UP (ref 7–23)
CALCIUM SERPL-MCNC: 9.3 MG/DL — SIGNIFICANT CHANGE UP (ref 8.5–10.5)
CHLORIDE SERPL-SCNC: 104 MMOL/L — SIGNIFICANT CHANGE UP (ref 96–108)
CO2 SERPL-SCNC: 28 MMOL/L — SIGNIFICANT CHANGE UP (ref 22–31)
CREAT SERPL-MCNC: 0.73 MG/DL — SIGNIFICANT CHANGE UP (ref 0.5–1.3)
D DIMER BLD IA.RAPID-MCNC: 261 NG/ML DDU — HIGH
EGFR: 109 ML/MIN/1.73M2 — SIGNIFICANT CHANGE UP
EOSINOPHIL # BLD AUTO: 0.4 K/UL — SIGNIFICANT CHANGE UP (ref 0–0.5)
EOSINOPHIL NFR BLD AUTO: 5 % — SIGNIFICANT CHANGE UP (ref 0–6)
GLUCOSE SERPL-MCNC: 80 MG/DL — SIGNIFICANT CHANGE UP (ref 70–99)
HCG SERPL-ACNC: <1 MIU/ML — SIGNIFICANT CHANGE UP
HCT VFR BLD CALC: 39 % — SIGNIFICANT CHANGE UP (ref 34.5–45)
HGB BLD-MCNC: 13 G/DL — SIGNIFICANT CHANGE UP (ref 11.5–15.5)
LIDOCAIN IGE QN: 23 U/L — SIGNIFICANT CHANGE UP (ref 16–77)
LYMPHOCYTES # BLD AUTO: 3.39 K/UL — HIGH (ref 1–3.3)
LYMPHOCYTES # BLD AUTO: 42 % — SIGNIFICANT CHANGE UP (ref 13–44)
MANUAL SMEAR VERIFICATION: SIGNIFICANT CHANGE UP
MCHC RBC-ENTMCNC: 30.9 PG — SIGNIFICANT CHANGE UP (ref 27–34)
MCHC RBC-ENTMCNC: 33.3 GM/DL — SIGNIFICANT CHANGE UP (ref 32–36)
MCV RBC AUTO: 92.6 FL — SIGNIFICANT CHANGE UP (ref 80–100)
MONOCYTES # BLD AUTO: 0.48 K/UL — SIGNIFICANT CHANGE UP (ref 0–0.9)
MONOCYTES NFR BLD AUTO: 6 % — SIGNIFICANT CHANGE UP (ref 2–14)
NEUTROPHILS # BLD AUTO: 3.79 K/UL — SIGNIFICANT CHANGE UP (ref 1.8–7.4)
NEUTROPHILS NFR BLD AUTO: 47 % — SIGNIFICANT CHANGE UP (ref 43–77)
NRBC # BLD: 0 /100 WBCS — SIGNIFICANT CHANGE UP (ref 0–0)
NRBC # BLD: SIGNIFICANT CHANGE UP /100 WBCS (ref 0–0)
NT-PROBNP SERPL-SCNC: 16 PG/ML — SIGNIFICANT CHANGE UP
PLAT MORPH BLD: NORMAL — SIGNIFICANT CHANGE UP
PLATELET # BLD AUTO: 264 K/UL — SIGNIFICANT CHANGE UP (ref 150–400)
POTASSIUM SERPL-MCNC: 4.2 MMOL/L — SIGNIFICANT CHANGE UP (ref 3.5–5.3)
POTASSIUM SERPL-SCNC: 4.2 MMOL/L — SIGNIFICANT CHANGE UP (ref 3.5–5.3)
PROT SERPL-MCNC: 7.7 G/DL — SIGNIFICANT CHANGE UP (ref 6.4–8.2)
RBC # BLD: 4.21 M/UL — SIGNIFICANT CHANGE UP (ref 3.8–5.2)
RBC # FLD: 12.7 % — SIGNIFICANT CHANGE UP (ref 10.3–14.5)
RBC BLD AUTO: NORMAL — SIGNIFICANT CHANGE UP
SODIUM SERPL-SCNC: 140 MMOL/L — SIGNIFICANT CHANGE UP (ref 132–145)
TROPONIN I, HIGH SENSITIVITY RESULT: 4.2 NG/L — SIGNIFICANT CHANGE UP
WBC # BLD: 8.07 K/UL — SIGNIFICANT CHANGE UP (ref 3.8–10.5)
WBC # FLD AUTO: 8.07 K/UL — SIGNIFICANT CHANGE UP (ref 3.8–10.5)

## 2024-05-07 PROCEDURE — 71275 CT ANGIOGRAPHY CHEST: CPT | Mod: 26,MC

## 2024-05-07 PROCEDURE — 71046 X-RAY EXAM CHEST 2 VIEWS: CPT | Mod: 26

## 2024-05-07 PROCEDURE — 99284 EMERGENCY DEPT VISIT MOD MDM: CPT

## 2024-05-07 RX ORDER — KETOROLAC TROMETHAMINE 30 MG/ML
15 SYRINGE (ML) INJECTION ONCE
Refills: 0 | Status: DISCONTINUED | OUTPATIENT
Start: 2024-05-07 | End: 2024-05-07

## 2024-05-07 RX ADMIN — Medication 15 MILLIGRAM(S): at 16:50

## 2024-05-07 NOTE — ED ADULT TRIAGE NOTE - BP NONINVASIVE DIASTOLIC (MM HG)
[Normal Rate and Rhythm] : normal rate and rhythm [JVD] : no jugular venous distention  [de-identified] : nad [de-identified] : deisy [de-identified] : ulcer/midline scar; recurrent hernia reducible [de-identified] : skin tear from tape at 8 o clock  [FreeTextEntry1] : abdominal hernia with ulcer [FreeTextEntry2] : 2.7 [FreeTextEntry3] : 1.7 [FreeTextEntry4] : .1 cm 84 [de-identified] : grafix pl applied 1/22

## 2024-05-07 NOTE — ED PROVIDER NOTE - ATTENDING CONTRIBUTION TO CARE
38-year-old female with no past medical history here for 2 days of atypical chest pain.  On exam, patient is afebrile, vital signs are stable.  Patient has tenderness to palpation of anterior and right lateral chest wall.  EKG nonischemic.  Troponin negative.  Chest x-ray clear.  D-dimer mildly elevated.  CTA negative for PE.  Likely musculoskeletal chest pain.  Patient feels better after Toradol and wants to go home.  Stable for DC with cardiology referral.

## 2024-05-07 NOTE — ED PROVIDER NOTE - CLINICAL SUMMARY MEDICAL DECISION MAKING FREE TEXT BOX
38-year-old female no past medical history no past surgical history presenting with 2 day of chest pain.  Patient was out shopping and heavy bags 3 days ago, and 2 days ago began having pain in her anterior and lateral chest.  Pain feels achy in character, 6 out of 10, radiates from the sternal region to bilateral ribs, exacerbated by movement of her torso arms, sitting forward laying down or palpating her own chest.  Denies pain with deep breaths.  Denies shortness of breath, fever/chills recent URI symptoms.  No recent long travel, denies OCP use.  No family of early cardiac incident.  All vitals here are stable, patient well-appearing, oral mucosa moist, no JVD, RRR, normal S1-S2 with no murmurs, no peripheral edema B/L, lungs CTA B/L.  Abdomen soft, ND NT.  + Tenderness to palpation of the anterior and lateral chest B/L.  Possible MSK chest pain secondary to heavy lifting, less likely ACS given nonischemic EKG and and lack of cardiac risk factors, less likely PE given patient low risk.  Will get labs, EKG CXR plus or minus CTA and reassess.

## 2024-05-07 NOTE — ED PROVIDER NOTE - NSFOLLOWUPINSTRUCTIONS_ED_ALL_ED_FT
Chest Pain    Chest pain can be caused by many different conditions which may or may not be dangerous. Causes include heartburn, lung infections, heart attack, blood clot in lungs, skin infections, strain or damage to muscle, cartilage, or bones, etc. In addition to a history and physical examination, an electrocardiogram (ECG) or other lab tests may have been performed to determine the cause of your chest pain. Follow up with your primary care provider as needed.    You may take Tylenol, and/or Ibuprofen as needed for pain. Please follow dosing instructions on medication labeling.    SEEK IMMEDIATE MEDICAL CARE IF YOU HAVE ANY OF THE FOLLOWING SYMPTOMS: worsening chest pain, coughing up blood, unexplained back/neck/jaw pain, severe abdominal pain, dizziness or lightheadedness, fainting, shortness of breath, sweaty or clammy skin, vomiting, or racing heart beat. These symptoms may represent a serious problem that is an emergency. Do not wait to see if the symptoms will go away. Get medical help right away. Call 911 and do not drive yourself to the hospital.

## 2024-05-07 NOTE — ED ADULT NURSE NOTE - NS ED NOTE  TALK SOMEONE YN

## 2024-05-07 NOTE — ED PROVIDER NOTE - PATIENT PORTAL LINK FT
You can access the FollowMyHealth Patient Portal offered by [facility] by registering at the following website: [email or web address]. By joining [employer]’s FollowMyHealth portal, you will also be able to view your health information using other applications (apps) compatible with our system.

## 2024-05-07 NOTE — ED PROVIDER NOTE - PHYSICAL EXAMINATION
GENERAL: well appearing in no acute distress, non-toxic appearing  HEENT: oral mucosa moist, no JVD  CARDIAC: regular rate and rhythm, normal S1S2, no appreciable murmurs  PULM: normal breath sounds, clear to ascultation bilaterally, no rales, rhonchi, wheezing  GI: Abd soft, nondistended, nontender, no rebound tenderness, no guarding, no rigidity  MSK: +anterior and R lat chest wall ttp  SKIN: well-perfused, extremities warm, no visible rashes

## 2024-06-04 NOTE — ED ADULT NURSE REASSESSMENT NOTE - NS ED NURSE REASSESS COMMENT FT1
"Spoke to patient to schedule procedure(s) Colonoscopy       Physician to perform procedure(s) Dr. AB Stanford  Date of Procedure (s) 24  Arrival Time 12:45 PM  Time of Procedure(s) 1:45 PM   Location of Procedure(s) [facility] Floor  Type of Rx Prep sent to patient: PEG  Instructions provided to patient via [facility]    Patient was informed on the following information and verbalized understanding. Screening questionnaire reviewed with patient and complete. If procedure requires anesthesia, a responsible adult needs to be present to accompany the patient home, patient cannot drive after receiving anesthesia. Appointment details are tentative, especially check-in time. Patient will receive a prep-op call 7 days prior to confirm check-in time for procedure. If applicable the patient should contact their pharmacy to verify Rx for procedure prep is ready for pick-up. Patient was advised to call the scheduling department at [phone] if pharmacy states no Rx is available. Patient was advised to call the endoscopy scheduling department if any questions or concerns arise.       Endoscopy Scheduling Department        Note  ----- Message from Monica Card sent at 5/10/2024  1:53 PM CDT -----     ----- Message -----  From: Paras Stanford MD  Sent: 2024  12:33 PM CDT  To: [facility]     Procedure: Colonoscopy     Diagnosis: Surveillance colonoscopy - Hx of colon polyps     Procedure Timin-12 weeks     #If within 4 weeks selected, please bhavya as high priority#     #If greater than 12 weeks, please select "5-12 weeks" and delay sending until 3 months prior to requested date#      Location: [facility]     Additional Scheduling Information: Pulm HTN     Prep Specifications:Standard prep     Is the patient taking a GLP-1 Agonist:no     Have you attached a patient to this message: yes           "
pt stable, in NAD, updated on lab results, pending US read. will continue to monitor.
Patient resting on stretcher comfortably NAD.

## 2024-07-31 NOTE — ED ADULT TRIAGE NOTE - MEANS OF ARRIVAL
ambulatory
Please follow up with Dr. MARIEE, your primary care doctor  August 5th 4:20PM  [address]  [phone]    Please follow up with DR. ORTEGA, HEMATOLOGY ONCOLOGY:  August 7th 10AM  [address]  [phone]    Please follow up with ENDOCRINOLOGY:  August 26th 9AM  [address]   [phone]